# Patient Record
(demographics unavailable — no encounter records)

---

## 2024-10-28 NOTE — HEALTH RISK ASSESSMENT
[Good] : ~his/her~ current health as good [Fair] :  ~his/her~ mood as fair [Yes] : Yes [Little interest or pleasure doing things] : 1) Little interest or pleasure doing things [Feeling down, depressed, or hopeless] : 2) Feeling down, depressed, or hopeless [0] : 2) Feeling down, depressed, or hopeless: Not at all (0) [PHQ-2 Negative - No further assessment needed] : PHQ-2 Negative - No further assessment needed [Never] : Never [Patient reported mammogram was normal] : Patient reported mammogram was normal [With Family] : lives with family [# of Members in Household ___] :  household currently consist of [unfilled] member(s) [Employed] : employed [Graduate School] : graduate school [] :  [# Of Children ___] : has [unfilled] children [Feels Safe at Home] : Feels safe at home [de-identified] : Rarely. [de-identified] : Maintains active by walking. [de-identified] : Admits her diet could be better.  [[id number]] : 0 [MammogramDate] : 09/2023 [PapSmearDate] : n/a [PapSmearComments] : Hx. of hysterectomy. [ColonoscopyDate] : Never [ColonoscopyComments] : Pt. states she sees Dr. Palacios, which she will see for a colonoscopy. [de-identified] : Lives with her daughter. [FreeTextEntry3] : Has one daughter.

## 2024-10-28 NOTE — HISTORY OF PRESENT ILLNESS
[de-identified] : Ms. ROMÁN HERNANDEZ is a 48 year old female with Hx. of SARAH positive, HLD, herniated cervical disc, hysterectomy and vertigo presenting for an annual physical. She is doing well and feeling fine. She is UTD with routine health screenings. She walks daily and tries to follow a healthy diet, but struggles to lose weight on her own. She is interested in starting weight loss injections. Denies fhx of thyroid and pancreatic CA. Denies abdominal pain, CP, SOB, dizziness. She is otherwise well and offers no complaints.

## 2024-10-28 NOTE — PLAN
[FreeTextEntry1] : Annual physical exam. See plan.  Health maintenance colon cancer screening/GI referral breast cancer screening/obgyn referral   Hypercholesterolemia cont. rosuvastatin 5mg QHS  Flu vaccine declined.  Bloodwork ordered. Pt. instructed to follow up in 1 week via telehealth for lab results.

## 2024-10-28 NOTE — HEALTH RISK ASSESSMENT
[Good] : ~his/her~ current health as good [Fair] :  ~his/her~ mood as fair [Yes] : Yes [Little interest or pleasure doing things] : 1) Little interest or pleasure doing things [Feeling down, depressed, or hopeless] : 2) Feeling down, depressed, or hopeless [0] : 2) Feeling down, depressed, or hopeless: Not at all (0) [PHQ-2 Negative - No further assessment needed] : PHQ-2 Negative - No further assessment needed [Never] : Never [Patient reported mammogram was normal] : Patient reported mammogram was normal [With Family] : lives with family [# of Members in Household ___] :  household currently consist of [unfilled] member(s) [Employed] : employed [Graduate School] : graduate school [] :  [# Of Children ___] : has [unfilled] children [Feels Safe at Home] : Feels safe at home [de-identified] : Rarely. [de-identified] : Maintains active by walking. [de-identified] : Admits her diet could be better.  [[id number]] : 0 [MammogramDate] : 09/2023 [PapSmearDate] : n/a [PapSmearComments] : Hx. of hysterectomy. [ColonoscopyDate] : Never [ColonoscopyComments] : Pt. states she sees Dr. Palacios, which she will see for a colonoscopy. [de-identified] : Lives with her daughter. [FreeTextEntry3] : Has one daughter.

## 2024-10-28 NOTE — HISTORY OF PRESENT ILLNESS
[de-identified] : Ms. ROMÁN HERNANDEZ is a 48 year old female with Hx. of SARAH positive, HLD, herniated cervical disc, hysterectomy and vertigo presenting for an annual physical. She is doing well and feeling fine. She is UTD with routine health screenings. She walks daily and tries to follow a healthy diet, but struggles to lose weight on her own. She is interested in starting weight loss injections. Denies fhx of thyroid and pancreatic CA. Denies abdominal pain, CP, SOB, dizziness. She is otherwise well and offers no complaints.

## 2025-01-06 NOTE — REASON FOR VISIT
[Initial Evaluation] : an initial evaluation of [FreeTextEntry2] : vascular evaluation [FreeTextEntry1] : 1/6/2025  48-year-old Ple[place] female with history of SARAH positive, HLD, herniated cervical disc, hysterectomy and vertigo, LE edema, who presents for evaluation.  LE venous duplex 11/2024: No DVT.  VV for many years both legs Statin for HLD  VV in mother and grandmother

## 2025-01-06 NOTE — ASSESSMENT
[FreeTextEntry1] : Assessmetn: 1.  Asymptomatic VV 2.  HLD   Plan 1.  Reassurance given 2.  Patient was instructed to try copperfit easy on easy off knee high compression garments Put them on in the AM and take off in PM 3.  Patient was shown a pneumatic pump that she can purchase online and use for 20 minutes a day towards the end of the day.  This will help with venous congestion and venous return 4.  We discussed more advanced therapies for varicose veins, will hold off for now 5.  Venous duplex was negative for DVT  given Dr. Denton's information in case she wants to proceed with sclerotherapy of the veins

## 2025-01-06 NOTE — PHYSICAL EXAM
[General Appearance - Well Developed] : well developed [Normal Appearance] : normal appearance [Well Groomed] : well groomed [General Appearance - Well Nourished] : well nourished [No Deformities] : no deformities [General Appearance - In No Acute Distress] : no acute distress [Normal Conjunctiva] : the conjunctiva exhibited no abnormalities [Eyelids - No Xanthelasma] : the eyelids demonstrated no xanthelasmas [Normal Oral Mucosa] : normal oral mucosa [No Oral Pallor] : no oral pallor [No Oral Cyanosis] : no oral cyanosis [Normal Jugular Venous A Waves Present] : normal jugular venous A waves present [Normal Jugular Venous V Waves Present] : normal jugular venous V waves present [No Jugular Venous Sepulveda A Waves] : no jugular venous sepulveda A waves [Heart Rate And Rhythm] : heart rate and rhythm were normal [Heart Sounds] : normal S1 and S2 [Murmurs] : no murmurs present [Respiration, Rhythm And Depth] : normal respiratory rhythm and effort [Exaggerated Use Of Accessory Muscles For Inspiration] : no accessory muscle use [Auscultation Breath Sounds / Voice Sounds] : lungs were clear to auscultation bilaterally [Abdomen Soft] : soft [Abdomen Tenderness] : non-tender [] : no hepato-splenomegaly [Abdomen Mass (___ Cm)] : no abdominal mass palpated [Abnormal Walk] : normal gait [Gait - Sufficient For Exercise Testing] : the gait was sufficient for exercise testing [FreeTextEntry1] : scattered VV, strong pedal pulses

## 2025-02-21 NOTE — ASSESSMENT
[FreeTextEntry1] : ROMÁN HERNANDEZ was advised to undergo colonoscopy to which she agreed. The procedure will be performed in [facility]  [facility] with the assistance of an anesthesiologist. The patient was given a Sutabs preparation prescription and understood the  procedure as it was explained to her. She was given a booklet distributed by the [employer]  [employer] explaining the procedure in detail and she understood the risks of the procedure not limited to infection, bleeding, perforation or non- diagnosis of colorectal cancer. She was advised that she could not drive home, if she chooses to  receive sedation.  Further diagnostic and treatment recommendations will be based upon the procedure and any biopsies, if they are taken.  Thank you for allowing me to participate in this [facility] care.  , Best personal regards -- Don  I spent 24 minutes with the patient and answered all her questions

## 2025-02-21 NOTE — HISTORY OF PRESENT ILLNESS
[FreeTextEntry1] : She is a 48-year-old asymptomatic female referred for a screening colonoscopy.  She has never had a colonoscopy before. She has a family history of colon cancer specifically her grandfather.

## 2025-02-21 NOTE — CONSULT LETTER
[Dear  ___] : Dear  [unfilled], [Consult Letter:] : I had the pleasure of evaluating your patient, [unfilled]. [( Thank you for referring [unfilled] for consultation for _____ )] : Thank you for referring [unfilled] for consultation for [unfilled] [Please see my note below.] : Please see my note below. [Consult Closing:] : Thank you very much for allowing me to participate in the care of this patient.  If you have any questions, please do not hesitate to contact me. [Sincerely,] : Sincerely, [FreeTextEntry3] : Pérez Duncan MD  Gastroenterology [facility]

## 2025-04-27 NOTE — ASSESSMENT
[FreeTextEntry1] : High Fiber Diet  Repeat colonoscopy in 5 years  Office follow up if needed   I spent 22 minutes in the room with the patient and answered all of her  questions.

## 2025-04-27 NOTE — HISTORY OF PRESENT ILLNESS
[FreeTextEntry1] : Her recent colonoscopy revealed diverticular disease. No polyps or lesions were seen. She is feeling well and has no complaints.  She also has gallstones, but is asymptomatic.   Gali was in the room for the entire office visit.

## 2025-04-27 NOTE — CONSULT LETTER
[Dear  ___] : Dear  [unfilled], [Consult Letter:] : I had the pleasure of evaluating your patient, [unfilled]. [( Thank you for referring [unfilled] for consultation for _____ )] : Thank you for referring [unfilled] for consultation for [unfilled] [Please see my note below.] : Please see my note below. [Consult Closing:] : Thank you very much for allowing me to participate in the care of this patient.  If you have any questions, please do not hesitate to contact me. [Sincerely,] : Sincerely, [FreeTextEntry3] : Pérez Duncan MD  Gastroenterology [facility]

## 2025-06-19 NOTE — ASSESSMENT
[FreeTextEntry1] :  Urinary frequency increase po fluids we'll check UA and urine culture today  HLD cont rosuvastatin 5mg daily discussed importance of following a low cholesterol/low fat diet we'll check Lipid panel and LFT's today  blood work ordered  follow up in one week for lab results

## 2025-06-19 NOTE — PHYSICAL EXAM
[No Acute Distress] : no acute distress [Normal Sclera/Conjunctiva] : normal sclera/conjunctiva [Normal Outer Ear/Nose] : the outer ears and nose were normal in appearance [No JVD] : no jugular venous distention [Normal] : normal rate, regular rhythm, normal S1 and S2 and no murmur heard [No Edema] : there was no peripheral edema [Soft] : abdomen soft [Non Tender] : non-tender [Non-distended] : non-distended [Normal Bowel Sounds] : normal bowel sounds [Normal Anterior Cervical Nodes] : no anterior cervical lymphadenopathy [No CVA Tenderness] : no CVA  tenderness [No Joint Swelling] : no joint swelling [Normal Gait] : normal gait [Speech Grossly Normal] : speech grossly normal [Alert and Oriented x3] : oriented to person, place, and time [Normal Insight/Judgement] : insight and judgment were intact

## 2025-06-19 NOTE — HISTORY OF PRESENT ILLNESS
[de-identified] : Ms. ROMÁN HERNANDEZ is a 49 year old female, with hx. of SARAH positive, HLD, herniated cervical disc, hysterectomy and vertigo presents for acute visit She c/o frequent urination and urgency  that started few days ago Denies fever, chills, dysuria, abdominal pain, back pain